# Patient Record
Sex: MALE | Race: WHITE | NOT HISPANIC OR LATINO | URBAN - METROPOLITAN AREA
[De-identification: names, ages, dates, MRNs, and addresses within clinical notes are randomized per-mention and may not be internally consistent; named-entity substitution may affect disease eponyms.]

---

## 2020-11-18 ENCOUNTER — TELEPHONE ENCOUNTER (OUTPATIENT)
Dept: URBAN - METROPOLITAN AREA CLINIC 92 | Facility: CLINIC | Age: 20
End: 2020-11-18

## 2020-11-18 ENCOUNTER — DASHBOARD ENCOUNTERS (OUTPATIENT)
Age: 20
End: 2020-11-18

## 2020-11-18 ENCOUNTER — OFFICE VISIT (OUTPATIENT)
Dept: URBAN - NONMETROPOLITAN AREA CLINIC 2 | Facility: CLINIC | Age: 20
End: 2020-11-18
Payer: COMMERCIAL

## 2020-11-18 VITALS
TEMPERATURE: 97.2 F | SYSTOLIC BLOOD PRESSURE: 146 MMHG | BODY MASS INDEX: 24.87 KG/M2 | WEIGHT: 183.6 LBS | HEIGHT: 72 IN | HEART RATE: 56 BPM | DIASTOLIC BLOOD PRESSURE: 80 MMHG

## 2020-11-18 DIAGNOSIS — K21.9 GERD (GASTROESOPHAGEAL REFLUX DISEASE): ICD-10-CM

## 2020-11-18 DIAGNOSIS — R07.9 CHEST PAIN: ICD-10-CM

## 2020-11-18 PROCEDURE — G8420 CALC BMI NORM PARAMETERS: HCPCS | Performed by: NURSE PRACTITIONER

## 2020-11-18 PROCEDURE — G9903 PT SCRN TBCO ID AS NON USER: HCPCS | Performed by: NURSE PRACTITIONER

## 2020-11-18 PROCEDURE — 99213 OFFICE O/P EST LOW 20 MIN: CPT | Performed by: NURSE PRACTITIONER

## 2020-11-18 PROCEDURE — G8427 DOCREV CUR MEDS BY ELIG CLIN: HCPCS | Performed by: NURSE PRACTITIONER

## 2020-11-18 RX ORDER — SUCRALFATE 1 G/1
TAKE 1 TABLET BY ORAL ROUTE 3 TIMES A DAY  1 HOUR BEFORE LUNCH, SUPPER, AND BEDTIME, NOT WITH IN ONE HOUR OF OTHER MEDICATIONS TABLET ORAL
Qty: 90 | Refills: 11 | Status: ACTIVE | COMMUNITY
Start: 2019-12-17 | End: 2020-12-11

## 2020-11-18 RX ORDER — OMEPRAZOLE 40 MG/1
TAKE 1 CAPSULE BY ORAL ROUTE DAILY FOR 90 DAYS CAPSULE, DELAYED RELEASE PELLETS ORAL 1
Qty: 90 | Refills: 3 | Status: ON HOLD | COMMUNITY
Start: 2019-12-17 | End: 2020-12-11

## 2020-11-18 RX ORDER — SUCRALFATE 1 G/1
1 TABLET ON AN EMPTY STOMACH TABLET ORAL
Qty: 60 | Refills: 11
Start: 2019-12-17 | End: 2020-12-11

## 2020-11-18 RX ORDER — ACETAMINOPHEN, ASPIRIN (NSAID) AND CAFFEINE 250; 250; 65 MG/1; MG/1; MG/1
PRN TABLET, FILM COATED ORAL
Qty: 0 | Refills: 0 | Status: ACTIVE | COMMUNITY
Start: 1900-01-01

## 2020-11-18 RX ORDER — AMITRIPTYLINE HYDROCHLORIDE 10 MG/1
TAKE 1 TABLET BY ORAL ROUTE ONCE A DAY (AT BEDTIME) TABLET, FILM COATED ORAL 1
Qty: 30 | Refills: 11 | Status: ACTIVE | COMMUNITY
Start: 2019-12-17 | End: 2020-12-11

## 2020-11-18 RX ORDER — FAMOTIDINE 40 MG/1
1 TABLET AT BEDTIME PRN TABLET, FILM COATED ORAL ONCE A DAY
Qty: 90 TABLET | Refills: 3 | OUTPATIENT
Start: 2020-11-18

## 2020-11-18 NOTE — HPI-TODAY'S VISIT:
Problem: Communication  Goal: The ability to communicate needs accurately and effectively will improve  Outcome: PROGRESSING AS EXPECTED     Problem: Bowel/Gastric:  Goal: Normal bowel function is maintained or improved  Outcome: PROGRESSING AS EXPECTED     Problem: Knowledge Deficit  Goal: Knowledge of disease process/condition, treatment plan, diagnostic tests, and medications will improve  Outcome: PROGRESSING AS EXPECTED     Problem: Discharge Barriers/Planning  Goal: Patient's continuum of care needs will be met  Outcome: PROGRESSING AS EXPECTED      11/18/2020 Mr. Scott is here for f/u of GERD. He was doing fairly well in March on carafate and omeprazole 40mg daily. He was advised to start weaning off the carafate. About 2 months ago, he stopped the omeprazole. He has done ok unless he has a bad week of eating or drinks too much. He will have pain making it hard to sleep. This only lasts for a day and then resolves. He denies any weight loss or melena.  He is a [employer] at [employer]. CS

## 2020-11-18 NOTE — HPI-OTHER HISTORIES
History Of Present Illness    11/6/2019 Mr. Woo Scott is a 19 year old male here for chest pain and acid reflux. He has been having chest pain and reflux for the last 5-6 weeks. He went to the ER once for severe chest pressure, elevated Hrt rate, and feeling like he was going to pass out. His work up was negative and started on omperazole 20mg daily. This did not help so his PCP bumped it up to 40mg daily. This has not helped. He has been trying to avoid alcohol and fast food. He continues to have symptoms. He feels poor. CS  11/7/2019 EGD: antrum erosion  12/17/2019 Woo is here for EGD f/u. He was taking omprazole 40mg daily and had an EGD with an erosion in his stomach. He has stopped the omeprazole. He did not take the levsin or carafate. He continues to have some pain. He feels stress is contributing. He had a lot of symptoms while taking his finals. He is feeling a little better now. CS  1/23/2020 Mr. Scott is here for f/u of chest pain and GERD. At his last OV, he was feeling about the same. He had stopped the omeprazole and saw a stress was contributing. He was restarted on the omeprazole, carafate, and AMT for functional dyspepsia. Today, he is feeling better but still does not feel quite right. He was unable to tolerated the AMT. It caused a HA and make him very tired- he took it for 2 days. He has been taking the omeprazole in the morning and carafate three times a day. He has not needed the bentyl. He drink vodka and water a few weeks ago and his stomach hurt for 2-3 days after- this was better then a month ago when he drank beer and hurt for a week. He feels his stress this semester is a little better. He applied to [employer] and this caused him to not feel well. He has not discussed this with his PCP. He has been having HA with sensitivity to lights. He discussed this with his PCP and advised to see his eye doctor. His eyes were normal except for allergies. He took eye drops for a few days and this made his eyes dry so he stopped. He continues to have GUERRERO. CS  3/2/2020 Mr. Scott is here for f/u of chest pain and GERD. He denies any further chest pain. His reflux is fairly well controlled with carafate 2-3 times and omeprazole 40mg daily. He has missed a few doses of carafate without any return of symptoms. He mainly only has symptoms if he drinks multiple alcohol drinks.  He continues to have HA but this are mild and no longer daily. He feels he is managing his stress it his just hard that he is not on a consistant schedule. He is going skiing for Spring break and then [employer] for 6 weeks for a summer class. CS